# Patient Record
Sex: MALE | Race: WHITE
[De-identification: names, ages, dates, MRNs, and addresses within clinical notes are randomized per-mention and may not be internally consistent; named-entity substitution may affect disease eponyms.]

---

## 2018-07-26 ENCOUNTER — HOSPITAL ENCOUNTER (OUTPATIENT)
Dept: HOSPITAL 39 - RAD | Age: 68
End: 2018-07-26
Attending: GENERAL PRACTICE
Payer: MEDICARE

## 2018-07-26 DIAGNOSIS — R10.9: Primary | ICD-10-CM

## 2018-07-26 NOTE — CT
EXAM DESCRIPTION: Abdomen w/Contrast



CLINICAL HISTORY: 68 years Male, ABDOMINAL PAIN especially at the

right diaphragm.



COMPARISON: CT abdomen pelvis dated November 11, 2013.



Technique: Contiguous  5 mm axial images are obtained from above

the level of diaphragm to below the kidney level with the use of

oral and intravenous contrast material. Reformatted coronal and

sagittal images also obtained.



FINDINGS: The visualized lower thorax appears grossly

unremarkable with no pleural or pericardial effusion. Pleural

thickening noted along the left lung base.



The liver appears grossly unremarkable with no focal mass lesion

or intrahepatic ductal dilatation. The gallbladder is minimally

distended and appears grossly unremarkable with no

pericholecystic fat stranding or fluid. The spleen, pancreas and

both adrenal glands appear grossly unremarkable. Both kidneys

enhance symmetrically with no evidence of hydronephrosis,

nephrolithiasis, hydroureter or perinephric stranding. A 4.2 cm

exophytic cystic lesion in the lower pole of left kidney most

consistent with a simple cyst.



The distal esophagus and the stomach appear grossly unremarkable.

The visualized small and large bowel loops appear grossly

unremarkable. Diverticula are noted throughout the visualized

colon with no evidence of diverticulitis. The appendix is not

well-visualized, however no surrounding inflammatory changes

noted in the right lower quadrant to suggest acute appendicitis.

No free intraperitoneal air or fluid. Few prominent

retroperitoneal lymph nodes noted, the largest left para-aortic

node measuring up to 1 cm likely reactive. Mild atherosclerotic

calcifications of the abdominal aorta seen.



Review of the bone windows demonstrate severe degenerative

changes predominantly involving L4-L5 and L5-S1 with

intervertebral disc space narrowing and vacuum disc phenomenon.



IMPRESSION:

1. No acute intra-abdominal process.

2. Severe degenerative changes predominantly involving L4-L5 and

L5-S1.



This exam was performed according to our departmental

dose-optimization program, which includes automated exposure

control, adjustment of the mA and/or kV according to patient size

and/or use of iterative reconstruction technique.



Electronically signed by:  Wicho Zafar MD  7/26/2018

3:59 PM CDT Workstation: [id number]

## 2018-10-10 ENCOUNTER — HOSPITAL ENCOUNTER (OUTPATIENT)
Dept: HOSPITAL 39 - NM | Age: 68
End: 2018-10-10
Attending: SURGERY
Payer: MEDICARE

## 2018-10-10 DIAGNOSIS — R10.11: Primary | ICD-10-CM

## 2018-10-10 PROCEDURE — 78227 HEPATOBIL SYST IMAGE W/DRUG: CPT

## 2018-10-10 NOTE — NM
EXAM DESCRIPTION: 

Hepatobiliary w/CCK: Nuclear Medicine.



CLINICAL HISTORY: 

  RT QUAD PAIN



COMPARISON: 

Abdominal CT scan with contrast 7/26/2018. 



TECHNIQUE: 

Patient was given 7.6  mCi of technetium 99 M mebrofenin

radiopharmaceutical IV. Anterior gamma camera images were

obtained of the right upper quadrant at 1 minute  intervals for

one hour . The patient was then given 8 ounces of nutrition

supplement, equivalent to a 11 g fatty meal..Gallbladder ejection

fraction was evaluated by measuring diminishing radioactivity in

the gallbladder, over 30 min interval. 



FINDINGS: 

Prompt visualization of the entire liver after administration of

the radiopharmaceutical IV. Minimal attenuation of the junction

of the right and left hepatic lobes. Prompt visualization of

intrahepatic ducts, and gallbladder and extrahepatic ducts and

intestine were seen in a timely manner.



After fatty meal was ingested, patient had a small amount of

right upper quadrant pain without nausea. Activity increased

after fatty meal was ingested with peak activity at 8 minutes and

25 minutes after ingestion. More activity was noted in the

gallbladder after 30 minutes interval then initially.. 



IMPRESSION: 

1. No intrahepatic or extrahepatic biliary obstruction. Timely

visualization of the gallbladder.

2. Gallbladder ejection fraction is 0 indicating a markedly

dysfunctional gallbladder. This could be due to chronic

cholecystitis or gallbladder dyskinesia. 



Electronically signed by:  Manuel Oconnell MD  10/10/2018 4:13 PM

CDT Workstation: [id number]